# Patient Record
Sex: MALE | Race: WHITE | HISPANIC OR LATINO
[De-identification: names, ages, dates, MRNs, and addresses within clinical notes are randomized per-mention and may not be internally consistent; named-entity substitution may affect disease eponyms.]

---

## 2017-03-18 ENCOUNTER — HOSPITAL (OUTPATIENT)
Dept: OTHER | Age: 82
End: 2017-03-18

## 2017-03-18 ENCOUNTER — HOSPITAL (OUTPATIENT)
Dept: OTHER | Age: 82
End: 2017-03-18
Attending: SURGERY

## 2017-03-18 LAB
ANALYZER ANC (IANC): ABNORMAL
ANION GAP SERPL CALC-SCNC: 17 MMOL/L (ref 10–20)
APTT PPP: 25 SECONDS (ref 22–30)
APTT PPP: NORMAL S
BASOPHILS # BLD: 0 THOUSAND/MCL (ref 0–0.3)
BASOPHILS NFR BLD: 0 %
BUN SERPL-MCNC: 7 MG/DL (ref 6–20)
BUN/CREAT SERPL: 7 (ref 7–25)
CALCIUM SERPL-MCNC: 8.4 MG/DL (ref 8.4–10.2)
CHLORIDE: 97 MMOL/L (ref 98–107)
CO2 SERPL-SCNC: 24 MMOL/L (ref 21–32)
CREAT SERPL-MCNC: 0.95 MG/DL (ref 0.67–1.17)
DIFFERENTIAL METHOD BLD: ABNORMAL
EOSINOPHIL # BLD: 0 THOUSAND/MCL (ref 0.1–0.5)
EOSINOPHIL NFR BLD: 0 %
ERYTHROCYTE [DISTWIDTH] IN BLOOD: 14.2 % (ref 11–15)
ETHANOL SERPL-MCNC: 209 MG/DL
GLUCOSE BLDC GLUCOMTR-MCNC: 122 MG/DL (ref 65–99)
GLUCOSE BLDC GLUCOMTR-MCNC: 95 MG/DL (ref 65–99)
GLUCOSE SERPL-MCNC: 133 MG/DL (ref 65–99)
HEMATOCRIT: 37 % (ref 39–51)
HGB BLD-MCNC: 12.9 GM/DL (ref 13–17)
INR PPP: 1
LYMPHOCYTES # BLD: 3.5 THOUSAND/MCL (ref 1–4)
LYMPHOCYTES NFR BLD: 26 %
MCH RBC QN AUTO: 32.6 PG (ref 26–34)
MCHC RBC AUTO-ENTMCNC: 34.9 GM/DL (ref 32–36.5)
MCV RBC AUTO: 93.4 FL (ref 78–100)
MONOCYTES # BLD: 0.4 THOUSAND/MCL (ref 0.3–0.9)
MONOCYTES NFR BLD: 3 %
NEUTROPHILS # BLD: 9.5 THOUSAND/MCL (ref 1.8–7.7)
NEUTROPHILS NFR BLD: 71 %
NEUTS SEG NFR BLD: ABNORMAL %
PERCENT NRBC: ABNORMAL
PLATELET # BLD: 221 THOUSAND/MCL (ref 140–450)
POTASSIUM SERPL-SCNC: 3.8 MMOL/L (ref 3.4–5.1)
PROTHROMBIN TIME: 10.7 SECONDS (ref 9.7–11.8)
PROTHROMBIN TIME: NORMAL
RBC # BLD: 3.96 MILLION/MCL (ref 4.5–5.9)
SODIUM SERPL-SCNC: 134 MMOL/L (ref 135–145)
WBC # BLD: 13.5 THOUSAND/MCL (ref 4.2–11)

## 2017-05-01 ENCOUNTER — HOSPITAL (OUTPATIENT)
Dept: OTHER | Age: 82
End: 2017-05-01
Attending: FAMILY MEDICINE

## 2017-05-16 ENCOUNTER — HOSPITAL (OUTPATIENT)
Dept: OTHER | Age: 82
End: 2017-05-16
Attending: OTOLARYNGOLOGY

## 2017-05-19 ENCOUNTER — HOSPITAL (OUTPATIENT)
Dept: OTHER | Age: 82
End: 2017-05-19
Attending: FAMILY MEDICINE

## 2024-03-19 ENCOUNTER — APPOINTMENT (OUTPATIENT)
Dept: CT IMAGING | Facility: HOSPITAL | Age: 89
End: 2024-03-19
Attending: EMERGENCY MEDICINE
Payer: MEDICARE

## 2024-03-19 ENCOUNTER — HOSPITAL ENCOUNTER (EMERGENCY)
Facility: HOSPITAL | Age: 89
Discharge: HOME | End: 2024-03-19
Attending: EMERGENCY MEDICINE
Payer: MEDICARE

## 2024-03-19 VITALS
HEIGHT: 68 IN | SYSTOLIC BLOOD PRESSURE: 145 MMHG | WEIGHT: 180 LBS | TEMPERATURE: 99 F | RESPIRATION RATE: 18 BRPM | HEART RATE: 53 BPM | BODY MASS INDEX: 27.28 KG/M2 | OXYGEN SATURATION: 97 % | DIASTOLIC BLOOD PRESSURE: 72 MMHG

## 2024-03-19 DIAGNOSIS — R10.84 ABDOMINAL PAIN, GENERALIZED: Primary | ICD-10-CM

## 2024-03-19 LAB
ALBUMIN SERPL-MCNC: 4.5 G/DL (ref 3.2–4.8)
ALP LIVER SERPL-CCNC: 66 U/L
ALT SERPL-CCNC: <7 U/L
ANION GAP SERPL CALC-SCNC: 5 MMOL/L (ref 0–18)
AST SERPL-CCNC: 11 U/L (ref ?–34)
BASOPHILS # BLD AUTO: 0.05 X10(3) UL (ref 0–0.2)
BASOPHILS NFR BLD AUTO: 0.5 %
BILIRUB DIRECT SERPL-MCNC: 0.2 MG/DL (ref ?–0.3)
BILIRUB SERPL-MCNC: 0.4 MG/DL (ref 0.2–1.1)
BUN BLD-MCNC: 28 MG/DL (ref 9–23)
BUN/CREAT SERPL: 13.6 (ref 10–20)
CALCIUM BLD-MCNC: 9.4 MG/DL (ref 8.7–10.4)
CHLORIDE SERPL-SCNC: 108 MMOL/L (ref 98–112)
CO2 SERPL-SCNC: 24 MMOL/L (ref 21–32)
CREAT BLD-MCNC: 2.06 MG/DL
DEPRECATED RDW RBC AUTO: 45.9 FL (ref 35.1–46.3)
EGFRCR SERPLBLD CKD-EPI 2021: 30 ML/MIN/1.73M2 (ref 60–?)
EOSINOPHIL # BLD AUTO: 0.19 X10(3) UL (ref 0–0.7)
EOSINOPHIL NFR BLD AUTO: 2 %
ERYTHROCYTE [DISTWIDTH] IN BLOOD BY AUTOMATED COUNT: 13.4 % (ref 11–15)
GLUCOSE BLD-MCNC: 93 MG/DL (ref 70–99)
HCT VFR BLD AUTO: 32.1 %
HGB BLD-MCNC: 10.5 G/DL
IMM GRANULOCYTES # BLD AUTO: 0.03 X10(3) UL (ref 0–1)
IMM GRANULOCYTES NFR BLD: 0.3 %
LIPASE SERPL-CCNC: 34 U/L (ref 13–75)
LYMPHOCYTES # BLD AUTO: 3.81 X10(3) UL (ref 1–4)
LYMPHOCYTES NFR BLD AUTO: 40.1 %
MCH RBC QN AUTO: 30.3 PG (ref 26–34)
MCHC RBC AUTO-ENTMCNC: 32.7 G/DL (ref 31–37)
MCV RBC AUTO: 92.8 FL
MONOCYTES # BLD AUTO: 0.65 X10(3) UL (ref 0.1–1)
MONOCYTES NFR BLD AUTO: 6.8 %
NEUTROPHILS # BLD AUTO: 4.76 X10 (3) UL (ref 1.5–7.7)
NEUTROPHILS # BLD AUTO: 4.76 X10(3) UL (ref 1.5–7.7)
NEUTROPHILS NFR BLD AUTO: 50.3 %
OSMOLALITY SERPL CALC.SUM OF ELEC: 289 MOSM/KG (ref 275–295)
PLATELET # BLD AUTO: 206 10(3)UL (ref 150–450)
POTASSIUM SERPL-SCNC: 4.6 MMOL/L (ref 3.5–5.1)
PROT SERPL-MCNC: 7.7 G/DL (ref 5.7–8.2)
RBC # BLD AUTO: 3.46 X10(6)UL
SODIUM SERPL-SCNC: 137 MMOL/L (ref 136–145)
WBC # BLD AUTO: 9.5 X10(3) UL (ref 4–11)

## 2024-03-19 PROCEDURE — 80076 HEPATIC FUNCTION PANEL: CPT | Performed by: EMERGENCY MEDICINE

## 2024-03-19 PROCEDURE — 85025 COMPLETE CBC W/AUTO DIFF WBC: CPT | Performed by: EMERGENCY MEDICINE

## 2024-03-19 PROCEDURE — 99283 EMERGENCY DEPT VISIT LOW MDM: CPT

## 2024-03-19 PROCEDURE — 99284 EMERGENCY DEPT VISIT MOD MDM: CPT

## 2024-03-19 PROCEDURE — 83690 ASSAY OF LIPASE: CPT | Performed by: EMERGENCY MEDICINE

## 2024-03-19 PROCEDURE — 74176 CT ABD & PELVIS W/O CONTRAST: CPT | Performed by: EMERGENCY MEDICINE

## 2024-03-19 PROCEDURE — 80048 BASIC METABOLIC PNL TOTAL CA: CPT | Performed by: EMERGENCY MEDICINE

## 2024-03-19 PROCEDURE — 36415 COLL VENOUS BLD VENIPUNCTURE: CPT

## 2024-03-19 NOTE — ED INITIAL ASSESSMENT (HPI)
Patient ambulatory to ED with complaint of generalized abd pain that started for awhile that's now worsening. Endorses constipation.       Patient is AXOX4.

## 2024-03-20 NOTE — ED PROVIDER NOTES
Patient Seen in: [facility] Emergency Department    History     Chief Complaint   Patient presents with    Abdomen/Flank Pain       HPI    Patient presents to the ED complaining of generalized abdominal pain that started several months ago.  He notes some constipation.  Decreased appetite as well per family.  Family states that he has not had fever, chills or other complaints.  They were sent to the ED by his doctor for evaluation.  The patient's family translates for the patient at his request.    History reviewed.   Past Medical History:   Diagnosis Date    BPH (benign prostatic hyperplasia)     CHF (congestive heart failure) (HCC)     CKD (chronic kidney disease)     HTN (hypertension)     Hyperlipidemia        History reviewed. History reviewed. No pertinent surgical history.      Medications :  (Not in a hospital admission)       History reviewed. No pertinent family history.    Smoking Status:   Social History     Socioeconomic History    Marital status:        Constitutional and vital signs reviewed.      Social History and Family History elements reviewed from today, pertinent positives to the presenting problem noted.    Physical Exam     ED Triage Vitals [03/19/24 1802]   BP (!) 162/81   Pulse 60   Resp 18   Temp 98.6 °F (37 °C)   Temp src Temporal   SpO2 100 %   O2 Device None (Room air)       All measures to prevent infection transmission during my interaction with the patient were taken. Handwashing was performed prior to and after the exam.  Stethoscope and any equipment used during my examination was cleaned with super sani-cloth germicidal wipes following the exam.     Physical Exam  Vitals and nursing note reviewed.   Constitutional:       General: He is not in acute distress.     Appearance: He is obese. He is not ill-appearing or toxic-appearing.   HENT:      Head: Normocephalic and atraumatic.   Eyes:      General:         Right eye: No discharge.         Left eye: No discharge.       Conjunctiva/sclera: Conjunctivae normal.   Neck:      Trachea: No tracheal deviation.   Cardiovascular:      Rate and Rhythm: Normal rate.   Pulmonary:      Effort: Pulmonary effort is normal. No respiratory distress.      Breath sounds: No stridor.   Abdominal:      General: There is distension.      Palpations: Abdomen is soft.      Tenderness: There is abdominal tenderness.      Comments: Distended abdomen but soft diffusely, minimal diffuse tenderness.  No rebound or guarding.   Musculoskeletal:         General: No deformity.   Skin:     General: Skin is warm and dry.   Neurological:      Mental Status: He is alert and oriented to person, place, and time.   Psychiatric:         Mood and Affect: Mood normal.         Behavior: Behavior normal.         ED Course        Labs Reviewed   BASIC METABOLIC PANEL (8) - Abnormal; Notable for the following components:       Result Value    BUN 28 (*)     Creatinine 2.06 (*)     eGFR-Cr 30 (*)     All other components within normal limits   HEPATIC FUNCTION PANEL (7) - Abnormal; Notable for the following components:    ALT <7 (*)     All other components within normal limits   CBC W/ DIFFERENTIAL - Abnormal; Notable for the following components:    RBC 3.46 (*)     HGB 10.5 (*)     HCT 32.1 (*)     All other components within normal limits   LIPASE - Normal   CBC WITH DIFFERENTIAL WITH PLATELET    Narrative:     The following orders were created for panel order CBC With Differential With Platelet.                  Procedure                               Abnormality         Status                                     ---------                               -----------         ------                                     CBC W/ DIFFERENTIAL[[id number]]          Abnormal            Final result                                                 Please view results for these tests on the individual orders.       As Interpreted by me    Imaging Results Available and Reviewed while  in ED: CT ABDOMEN+PELVIS(CPT=74176)    Result Date: 3/19/2024  CONCLUSION:   Thickening and trabeculation of the urinary bladder is likely secondary to chronic bladder outlet obstruction, but cystitis is not excluded.  Recommend correlation with urinalysis.  Mild prostatomegaly.  No bowel obstruction.  Mild colonic stool burden.  The appendix is unremarkable.  Probably benign cyst arising from the right kidney upper pole.  Recommend nonemergent outpatient evaluation with renal ultrasound.  Additional chronic or incidental findings are described in the body of this report.    elm-remote  Dictated by (CST): Dileep Torres MD on 3/19/2024 at 8:20 PM     Finalized by (CST): Dileep Torres MD on 3/19/2024 at 8:29 PM         ED Medications Administered: Medications - No data to display      MDM     Vitals:    03/19/24 1802 03/19/24 2015 03/19/24 2045   BP: (!) 162/81 138/66 145/72   Pulse: 60 53 53   Resp: 18 18 18   Temp: 98.6 °F (37 °C)     TempSrc: Temporal     SpO2: 100% 98% 97%   Weight: 81.6 kg     Height: 172.7 cm (5' 8\")       *I personally reviewed and interpreted all ED vitals.    Pulse Ox: 97%, Room air, Normal     Monitor Interpretation:   normal sinus rhythm, sinus bradycardia as interpreted by me.  The cardiac monitor was ordered to monitor heart rate.    Differential Diagnosis/ Diagnostic Considerations: Abdominal obesity, intra-abdominal infection, constipation, bowel obstruction, other    Complicating Factors: The patient already has does not have a problem list on file. to contribute to the complexity of this ED evaluation.    Medical Decision Making  Presents to the ED with abdominal pain and distention.  Nondistressed on examination.  Benign abdomen.  Laboratory testing without concerning findings and CT scan without obvious cause of symptoms.  No urinary symptoms and patient without leukocytosis.  Stable for discharge with outpatient follow-up.    Problems Addressed:  Abdominal pain, generalized: acute  illness or injury that poses a threat to life or bodily functions    Amount and/or Complexity of Data Reviewed  Independent Historian:      Details: Patient's family provides history details and translates for the patient at his request  Labs: ordered. Decision-making details documented in ED Course.  Radiology: ordered and independent interpretation performed. Decision-making details documented in ED Course.     Details: I personally reviewed the patient's CT abdomen pelvis images and noted no free air or SBO        Condition upon leaving the department: Stable    Disposition and Plan     Clinical Impression:  1. Abdominal pain, generalized        Disposition:  Discharge    Follow-up:  Your doctor    Schedule an appointment as soon as possible for a visit in 3 day(s)        Medications Prescribed:  There are no discharge medications for this patient.

## 2025-04-06 ENCOUNTER — HOSPITAL ENCOUNTER (EMERGENCY)
Facility: HOSPITAL | Age: 89
Discharge: HOME | End: 2025-04-06
Attending: STUDENT IN AN ORGANIZED HEALTH CARE EDUCATION/TRAINING PROGRAM
Payer: MEDICARE

## 2025-04-06 ENCOUNTER — APPOINTMENT (OUTPATIENT)
Dept: GENERAL RADIOLOGY | Facility: HOSPITAL | Age: 89
End: 2025-04-06
Attending: STUDENT IN AN ORGANIZED HEALTH CARE EDUCATION/TRAINING PROGRAM
Payer: MEDICARE

## 2025-04-06 VITALS
OXYGEN SATURATION: 98 % | TEMPERATURE: 97 F | DIASTOLIC BLOOD PRESSURE: 78 MMHG | RESPIRATION RATE: 13 BRPM | SYSTOLIC BLOOD PRESSURE: 109 MMHG | HEART RATE: 65 BPM

## 2025-04-06 DIAGNOSIS — S42.212S FX HUMERAL NECK, LEFT, SEQUELA: Primary | ICD-10-CM

## 2025-04-06 PROCEDURE — 93005 ELECTROCARDIOGRAM TRACING: CPT

## 2025-04-06 PROCEDURE — 93010 ELECTROCARDIOGRAM REPORT: CPT

## 2025-04-06 PROCEDURE — 73030 X-RAY EXAM OF SHOULDER: CPT | Performed by: STUDENT IN AN ORGANIZED HEALTH CARE EDUCATION/TRAINING PROGRAM

## 2025-04-06 PROCEDURE — 99284 EMERGENCY DEPT VISIT MOD MDM: CPT

## 2025-04-06 RX ORDER — ENALAPRIL MALEATE 5 MG/1
5 TABLET ORAL DAILY
COMMUNITY

## 2025-04-06 RX ORDER — IBUPROFEN 600 MG/1
600 TABLET, FILM COATED ORAL EVERY 8 HOURS PRN
Qty: 30 TABLET | Refills: 0 | Status: SHIPPED | OUTPATIENT
Start: 2025-04-06 | End: 2025-04-13

## 2025-04-06 RX ORDER — ASPIRIN 81 MG/1
81 TABLET ORAL DAILY
COMMUNITY

## 2025-04-06 NOTE — DISCHARGE INSTRUCTIONS
Thank you for seeking care at [facility] Emergency Department.    You have been seen in the ED today for a broken bone, known as a fracture.  You are stable to be discharged home, and follow up with orthopedics as an outpatient.     You have been provided a sling in the ED, please keep this on until you see the orthopedic specialist.   Keep your splint clean and dry. If you are showering, keep the splint out of the water, do a sponge bath, or place a bag over the splint and tape/rubber band it to avoid water from getting on it.   Elevated the region of injury if possible to reduce swelling    Take medications as prescribed    Please be sure to call the orthopedic specialist tomorrow to schedule further care in approximately 1 week.     Remember, your care process does not end after your visit today. Please follow-up with your doctor within 1-2 days for a follow-up check to ensure you are  improving, to see if you need any further evaluation/testing, or to evaluate for any alternate diagnoses.    Please return to the emergency department if you develop significant worsening of pain, numbness or tingling of the extremity, discoloration of the skin around the splint, fevers, or if you develop any other new or concerning symptoms as these could be signs of more serious medical illness.    We hope you feel better.

## 2025-04-06 NOTE — ED INITIAL ASSESSMENT (HPI)
Pt presents to ED for fall on Friday. Pt states he felt dizzy and fell walking out of [place]. Pt went to the nearest hospital that they  and they told him he has a fracture to the L upper arm.

## 2025-04-06 NOTE — ED QUICK NOTES
Patient approved for discharge per emergency department provider. Patient and daughters given verbal and written discharge instructions. Given instructions on rx x 1, follow up with ortho, and symptoms that necessitate coming back to the emergency department. Verbalizes understanding of discharge instructions.     Patient aox norm out of ED via wheelchair. Resp unlabored

## 2025-04-06 NOTE — ED QUICK NOTES
Chief Complaint   Patient presents with    Fall     Patient aoxnorm to ed via private vehicle with family. Per daughter patient had fall x Friday and received care at [facility] first where they were dx with humeral fracture to left arm. Patient's daughter stated was not happy with their care and would like to make sure everything is okay. Patient co of left arm pain +hematoma noted to left shoulder.     Patient changed in to gown on nibp cardiac and spo2 monitors.

## 2025-04-06 NOTE — ED PROVIDER NOTES
[facility] Emergency Department Note  Patient: Obi Jurado Age: 89 year old Sex: male      MRN: [id number]  : 1935    Patient Seen in: [facility] Emergency Department    History     Chief Complaint   Patient presents with    Fall     Stated Complaint: Fall    History obtained from: Patient    Patient is a 89-year-old male with a past medical history of hypertension, hyperlipidemia, CKD, CHF, BPH presenting today for evaluation of left shoulder pain.  Patient's daughter provides the history.  She states that 2 days ago, the patient had a fall in [employer] parking.  Was brought by EMS to an outside hospital.  Had imaging done and was told that he had broken his shoulder.  Patient was instructed to follow-up outpatient with orthopedic surgery and was discharged home with Norco.  Has been taking Norco with control of pain.  Family brought him to [facility] emergency department with hopes to establish care with [facility] providers.  Denies any worsening of pain.  Denies any new or repeated injury.  Denies any numbness or tingling.    Review of Systems:  Review of Systems  Positive for stated complaint: Fall. Constitutional and vital signs reviewed. All other systems reviewed and negative except as noted above.    Patient History:  Past Medical History:    BPH (benign prostatic hyperplasia)    CHF (congestive heart failure) (HCC)    CKD (chronic kidney disease)    HTN (hypertension)    Hyperlipidemia       History reviewed. No pertinent surgical history.     No family history on file.    Specific Social Determinants of Health:   Social History     Socioeconomic History    Marital status:    Tobacco Use    Smoking status: Never    Smokeless tobacco: Never   Substance and Sexual Activity    Alcohol use: Never    Drug use: Never           PSFH elements reviewed from today and agreed except as otherwise stated in HPI.    Physical Exam     ED Triage Vitals [25 1200]   /65   Pulse 76   Resp 18    Temp 97 °F (36.1 °C)   Temp src    SpO2 96 %   O2 Device None (Room air)       Current:/78   Pulse 65   Temp 97 °F (36.1 °C)   Resp 13   SpO2 98%         Physical Exam  Constitutional:       Appearance: He is well-developed.   HENT:      Head: Normocephalic and atraumatic.      Right Ear: External ear normal.      Left Ear: External ear normal.      Nose: Nose normal.   Eyes:      Conjunctiva/sclera: Conjunctivae normal.      Pupils: Pupils are equal, round, and reactive to light.   Cardiovascular:      Rate and Rhythm: Normal rate and regular rhythm.      Heart sounds: Normal heart sounds.   Pulmonary:      Effort: Pulmonary effort is normal.      Breath sounds: Normal breath sounds.   Abdominal:      General: Bowel sounds are normal.      Palpations: Abdomen is soft.      Tenderness: There is no abdominal tenderness.   Musculoskeletal:         General: Normal range of motion.      Cervical back: Normal range of motion and neck supple.      Comments: diminished range of motion of the left shoulder secondary to pain.  Left upper extremity immobilized in shoulder sling.  Radial pulses 2+ and equal bilaterally.  No sensory deficits in all neurologic distributions of the left hand, normal active and passive range of motion in the hand   Skin:     General: Skin is warm and dry.      Findings: No rash.      Comments: Ecchymoses over the left medial upper arm   Neurological:      General: No focal deficit present.      Mental Status: He is alert and oriented to person, place, and time.      Deep Tendon Reflexes: Reflexes are normal and symmetric.   Psychiatric:         Mood and Affect: Mood normal.         Behavior: Behavior normal.         ED Course   Labs:   Labs Reviewed - No data to display  Radiology findings:  I personally reviewed the images.   No results found.    PROCEDURE: XR SHOULDER, COMPLETE (MIN 2 VIEWS), LEFT (CPT=73030)      COMPARISON: None.      INDICATIONS: Patient complaining of left  shoulder pain post fall 3 days ago.      Findings and impression:      Comminuted and mildly displaced proximal humeral fracture extending into the greater tuberosity      No dislocation     EKG as interpreted by me: Ventricular rate 73, normal sinus rhythm, normal axis, no parable prolongation, narrow QRS, QTc 425 ms, no ST segment elevations or depressions, no abnormal T wave inversions  Cardiac Monitor: Interpreted by me.   Pulse Readings from Last 1 Encounters:   04/06/25 65   , sinus,     External non-ED records reviewed independently by me: Reviewed imaging performed at [facility] on 4//25.  CT head, cervical cervical spine, facial bones, all were unremarkable.  Chest x-ray is unremarkable, left shoulder x-ray is as follows:    XR SHOULDER LEFT 2+ VIEWS     CLINICAL INDICATION: 89 years-old-Male; fall, L shoulder pain     COMPARISON: None     TECHNIQUE: 2 views of the shoulder.   FINDINGS:     Fracture of the surgical neck of the humerus. The glenohumeral joint space is preserved.     The AC joint is intact with mild acromioclavicular degenerative changes.       MDM   89-year-old male with a past medical history of hypertension, hyperlipidemia, CKD, CHF, BPH presenting for evaluation of left shoulder pain after a recently diagnosed left surgical neck humerus fracture.  Left upper extremity is immobilized in splint.  No evidence of neurovascular injury.  No new traumas, falls or injury.  No new complaints.  Family hoping to establish care with [facility] providers    Differential diagnoses considered includes, but is not limited to: Closed humerus fracture    Will obtain the following tests: X-ray left shoulder  Please see ED course for my independent review of these tests/imaging results.    Initial Medications/Therapeutics administered: None    Chronic conditions affecting care: Hypertension, hyperlipidemia, CKD, CHF, BPH    Workup and medications considered but not ordered: None    Social  Determinants of Health that impacted care: None     ED course: Independently reviewed the x-ray of the left shoulder that confirms comminuted mildly displaced proximal humerus fracture but no dislocation.  Discussed continuation of immobilization with sling.  Recommended outpatient orthopedic surgery follow-up.  Patient's family requesting follow-up with Dr. Vallecillo.  Provided with follow-up information.  Has Norco at home with adequate pain control.  No evidence of acute complication.  Patient stable for discharge home at this time.  Return precautions provided and all questions answered.  Patient's daughter expressed understanding and agreement with plan      Disposition and Plan     Clinical Impression:  1. Fx humeral neck, left, sequela        Disposition:  Discharge    Follow-up:  Sy Corley  [address] [address] [phone]    Schedule an appointment as soon as possible for a visit in 2 day(s)  As needed    Ignacio Vallecillo MD  [address]  [address]  [facility] [id number] [phone]    Schedule an appointment as soon as possible for a visit in 1 week(s)        Medications Prescribed:  Discharge Medication List as of 4/6/2025  1:57 PM            This note may have been created using voice dictation technology and may include inadvertent errors.      Yin Barraza MD  Emergency Medicine

## 2025-04-07 LAB
ATRIAL RATE: 73 BPM
P AXIS: 51 DEGREES
P-R INTERVAL: 178 MS
Q-T INTERVAL: 386 MS
QRS DURATION: 86 MS
QTC CALCULATION (BEZET): 425 MS
R AXIS: 30 DEGREES
T AXIS: 42 DEGREES
VENTRICULAR RATE: 73 BPM